# Patient Record
Sex: MALE | Race: BLACK OR AFRICAN AMERICAN | Employment: FULL TIME | URBAN - METROPOLITAN AREA
[De-identification: names, ages, dates, MRNs, and addresses within clinical notes are randomized per-mention and may not be internally consistent; named-entity substitution may affect disease eponyms.]

---

## 2017-08-29 ENCOUNTER — APPOINTMENT (OUTPATIENT)
Dept: GENERAL RADIOLOGY | Age: 32
End: 2017-08-29
Attending: EMERGENCY MEDICINE
Payer: SELF-PAY

## 2017-08-29 ENCOUNTER — HOSPITAL ENCOUNTER (EMERGENCY)
Age: 32
Discharge: HOME | End: 2017-08-29
Attending: EMERGENCY MEDICINE
Payer: SELF-PAY

## 2017-08-29 VITALS
BODY MASS INDEX: 40.63 KG/M2 | RESPIRATION RATE: 18 BRPM | OXYGEN SATURATION: 100 % | WEIGHT: 300 LBS | HEIGHT: 72 IN | TEMPERATURE: 98.5 F | DIASTOLIC BLOOD PRESSURE: 99 MMHG | HEART RATE: 60 BPM | SYSTOLIC BLOOD PRESSURE: 165 MMHG

## 2017-08-29 DIAGNOSIS — M79.641 RIGHT HAND PAIN: Primary | ICD-10-CM

## 2017-08-29 PROCEDURE — 99283 EMERGENCY DEPT VISIT LOW MDM: CPT | Performed by: PHYSICIAN ASSISTANT

## 2017-08-29 PROCEDURE — 73130 X-RAY EXAM OF HAND: CPT

## 2017-08-29 PROCEDURE — 74011250637 HC RX REV CODE- 250/637: Performed by: EMERGENCY MEDICINE

## 2017-08-29 RX ORDER — COLCHICINE 0.6 MG/1
0.6 TABLET ORAL DAILY
Qty: 10 TAB | Refills: 0 | Status: SHIPPED | OUTPATIENT
Start: 2017-08-29 | End: 2017-09-08

## 2017-08-29 RX ORDER — COLCHICINE 0.6 MG/1
0.6 CAPSULE ORAL ONCE
Status: COMPLETED | OUTPATIENT
Start: 2017-08-29 | End: 2017-08-29

## 2017-08-29 RX ADMIN — COLCHICINE 0.6 MG: 0.6 CAPSULE ORAL at 03:20

## 2017-08-29 NOTE — ED PROVIDER NOTES
HPI Comments: Patient is a 30 yo male with PMH significant for gout presents with \"gout pain\". States he has had gout for multiple years, states he has a flare up once or twice a year and states he has had pain in his hand in this location in the past from gout and this feels similar. He has full ROM of hand, no fevers or chills, no redness to area, no injury, no further complaints. Patient well appearing, NAD. Patient is a 29 y.o. male presenting with hand pain. The history is provided by the patient. No  was used. Hand Pain    Pertinent negatives include no back pain and no neck pain. Past Medical History:   Diagnosis Date    Gout     Torsion, testicular        Past Surgical History:   Procedure Laterality Date    HX UROLOGICAL           History reviewed. No pertinent family history. Social History     Social History    Marital status: SINGLE     Spouse name: N/A    Number of children: N/A    Years of education: N/A     Occupational History    Not on file. Social History Main Topics    Smoking status: Current Every Day Smoker     Packs/day: 0.25    Smokeless tobacco: Never Used      Comment: Black & Mild    Alcohol use Yes      Comment: every other day    Drug use: Yes     Special: Marijuana    Sexual activity: Not on file     Other Topics Concern    Not on file     Social History Narrative         ALLERGIES: Review of patient's allergies indicates no known allergies. Review of Systems   Constitutional: Negative for chills and fever. HENT: Negative for rhinorrhea and sore throat. Eyes: Negative for visual disturbance. Respiratory: Negative for cough and shortness of breath. Cardiovascular: Negative for chest pain and leg swelling. Gastrointestinal: Negative for abdominal pain, diarrhea, nausea and vomiting. Genitourinary: Negative for dysuria. Musculoskeletal: Positive for arthralgias. Negative for back pain and neck pain.    Skin: Negative for rash. Neurological: Negative for weakness and headaches. Psychiatric/Behavioral: The patient is not nervous/anxious. Vitals:    08/29/17 0042   BP: (!) 182/112   Pulse: (!) 56   Resp: 17   Temp: 98.6 °F (37 °C)   SpO2: 99%   Weight: 136.1 kg (300 lb)   Height: 6' (1.829 m)            Physical Exam   Constitutional: He is oriented to person, place, and time. He appears well-developed and well-nourished. HENT:   Head: Normocephalic. Right Ear: External ear normal.   Left Ear: External ear normal.   Eyes: Conjunctivae and EOM are normal. Pupils are equal, round, and reactive to light. Neck: Normal range of motion. Neck supple. No tracheal deviation present. Cardiovascular: Normal rate, regular rhythm, normal heart sounds and intact distal pulses. No murmur heard. Pulmonary/Chest: Effort normal and breath sounds normal. No respiratory distress. Abdominal: Soft. There is no tenderness. Musculoskeletal: Normal range of motion. He exhibits tenderness (to very light palpation of right hand on ulnar side as well as to deep palpation. ). Full ROM of right wrist/hand, no redness noted, no snuffbox tenderness, radial pulse 2+ bilaterally, cap refill intact in all digits. Neurological: He is alert and oriented to person, place, and time. No cranial nerve deficit. Skin: No rash noted. Nursing note and vitals reviewed.        MDM  Number of Diagnoses or Management Options  Right hand pain: new and requires workup     Amount and/or Complexity of Data Reviewed  Tests in the radiology section of CPT®: reviewed and ordered  Review and summarize past medical records: yes    Risk of Complications, Morbidity, and/or Mortality  Presenting problems: moderate  Diagnostic procedures: moderate  Management options: moderate    Patient Progress  Patient progress: stable    ED Course       Procedures    Xr Hand Rt Min 3 V    Result Date: 8/29/2017  Examination: Right hand, 3 views History: Hand pain Comparison: None available. Findings: There is no evidence of fracture or dislocation. No osseous erosions are identified. Impression: No acute osseous abnormality. 28 yo male with wrist pain:       Patient very well appearing, NAD, consistent with prior gout and exam consistent with gout.   Will place in splint for comfort and colchicine and follow up with any worsening pain, swelling, any redness, any difficulty with ROM or any fevers or chills or any further concerns

## 2017-08-29 NOTE — ED TRIAGE NOTES
Patient to ed with c/o right hand pain stating \"I think my gout is flaring up in my hand\"--patient reports difficulty with ADLs due to pain--patient right hand swollen in triage--palpable radial pulse present

## 2018-06-03 ENCOUNTER — HOSPITAL ENCOUNTER (EMERGENCY)
Age: 33
Discharge: HOME | End: 2018-06-03
Attending: EMERGENCY MEDICINE
Payer: COMMERCIAL

## 2018-06-03 ENCOUNTER — APPOINTMENT (OUTPATIENT)
Dept: GENERAL RADIOLOGY | Age: 33
End: 2018-06-03
Attending: EMERGENCY MEDICINE
Payer: COMMERCIAL

## 2018-06-03 VITALS
RESPIRATION RATE: 16 BRPM | DIASTOLIC BLOOD PRESSURE: 89 MMHG | OXYGEN SATURATION: 98 % | WEIGHT: 300 LBS | HEIGHT: 72 IN | SYSTOLIC BLOOD PRESSURE: 156 MMHG | TEMPERATURE: 98.1 F | BODY MASS INDEX: 40.63 KG/M2 | HEART RATE: 71 BPM

## 2018-06-03 DIAGNOSIS — M25.532 LEFT WRIST PAIN: Primary | ICD-10-CM

## 2018-06-03 PROCEDURE — 74011250636 HC RX REV CODE- 250/636: Performed by: EMERGENCY MEDICINE

## 2018-06-03 PROCEDURE — 99283 EMERGENCY DEPT VISIT LOW MDM: CPT | Performed by: EMERGENCY MEDICINE

## 2018-06-03 PROCEDURE — 73110 X-RAY EXAM OF WRIST: CPT

## 2018-06-03 PROCEDURE — 96372 THER/PROPH/DIAG INJ SC/IM: CPT | Performed by: EMERGENCY MEDICINE

## 2018-06-03 RX ORDER — INDOMETHACIN 25 MG/1
25 CAPSULE ORAL 3 TIMES DAILY
Qty: 30 CAP | Refills: 0 | Status: SHIPPED | OUTPATIENT
Start: 2018-06-03 | End: 2018-06-13

## 2018-06-03 RX ORDER — KETOROLAC TROMETHAMINE 30 MG/ML
15 INJECTION, SOLUTION INTRAMUSCULAR; INTRAVENOUS
Status: COMPLETED | OUTPATIENT
Start: 2018-06-03 | End: 2018-06-03

## 2018-06-03 RX ORDER — TRAMADOL HYDROCHLORIDE 50 MG/1
50 TABLET ORAL
Qty: 11 TAB | Refills: 0 | Status: SHIPPED | OUTPATIENT
Start: 2018-06-03 | End: 2018-07-31

## 2018-06-03 RX ADMIN — KETOROLAC TROMETHAMINE 15 MG: 30 INJECTION, SOLUTION INTRAMUSCULAR at 08:49

## 2018-06-03 NOTE — DISCHARGE INSTRUCTIONS
Gout: Care Instructions  Your Care Instructions    Gout is a form of arthritis caused by a buildup of uric acid crystals in a joint. It causes sudden attacks of pain, swelling, redness, and stiffness, usually in one joint, especially the big toe. Gout usually comes on without a cause. But it can be brought on by drinking alcohol (especially beer) or eating seafood and red meat. Taking certain medicines, such as diuretics or aspirin, also can bring on an attack of gout. Taking your medicines as prescribed and following up with your doctor regularly can help you avoid gout attacks in the future. Follow-up care is a key part of your treatment and safety. Be sure to make and go to all appointments, and call your doctor if you are having problems. It's also a good idea to know your test results and keep a list of the medicines you take. How can you care for yourself at home? · If the joint is swollen, put ice or a cold pack on the area for 10 to 20 minutes at a time. Put a thin cloth between the ice and your skin. · Prop up the sore limb on a pillow when you ice it or anytime you sit or lie down during the next 3 days. Try to keep it above the level of your heart. This will help reduce swelling. · Rest sore joints. Avoid activities that put weight or strain on the joints for a few days. Take short rest breaks from your regular activities during the day. · Take your medicines exactly as prescribed. Call your doctor if you think you are having a problem with your medicine. · Take pain medicines exactly as directed. ¨ If the doctor gave you a prescription medicine for pain, take it as prescribed. ¨ If you are not taking a prescription pain medicine, ask your doctor if you can take an over-the-counter medicine. · Eat less seafood and red meat. · Check with your doctor before drinking alcohol. · Losing weight, if you are overweight, may help reduce attacks of gout. But do not go on a [employer]. \" Losing a lot of weight in a short amount of time can cause a gout attack. When should you call for help? Call your doctor now or seek immediate medical care if:  ? · You have a fever. ? · The joint is so painful you cannot use it. ? · You have sudden, unexplained swelling, redness, warmth, or severe pain in one or more joints. ? Watch closely for changes in your health, and be sure to contact your doctor if:  ? · You have joint pain. ? · Your symptoms get worse or are not improving after 2 or 3 days. Where can you learn more? Go to http://tran-silas.info/. Enter N621 in the search box to learn more about \"Gout: Care Instructions. \"  Current as of: October 31, 2016  Content Version: 11.4  © 5818-1189 [employer]. Care instructions adapted under license by [employer] (which disclaims liability or warranty for this information). If you have questions about a medical condition or this instruction, always ask your healthcare professional. [address] any warranty or liability for your use of this information.

## 2018-06-03 NOTE — LETTER
400 [facility] EMERGENCY DEPT 
1454 [facility] [address] [place] [id number] 
[phone] Work/School Note Date: 6/3/2018 To Whom It May concern: 
 
Maxi Peña was seen and treated today in the emergency room by the following provider(s): 
Attending Provider: Gwen Jenkins MD.   
 
Maxi Peña may return to work on 6/6. Sincerely, 
 
 
 
 
Gwen Jenkins MD

## 2018-06-03 NOTE — ED PROVIDER NOTES
HPI Comments: 74-year-old male presents to the emergency department with worsening pain in the left wrist and hand. Started a few nights ago. Much worse overnight and this morning. Increased pain with movement. Slightly better with rest.  No trauma. No falls. No accidents    Patient has a history gout in the left wrist.    Patient is a 34 y.o. male presenting with hand pain. Hand Pain           Past Medical History:   Diagnosis Date    Gout     Torsion, testicular        Past Surgical History:   Procedure Laterality Date    HX UROLOGICAL           History reviewed. No pertinent family history. Social History     Social History    Marital status: SINGLE     Spouse name: N/A    Number of children: N/A    Years of education: N/A     Occupational History    Not on file. Social History Main Topics    Smoking status: Current Every Day Smoker     Packs/day: 0.25    Smokeless tobacco: Never Used      Comment: Black & Mild    Alcohol use Yes      Comment: every other day    Drug use: Yes     Special: Marijuana    Sexual activity: Not on file     Other Topics Concern    Not on file     Social History Narrative         ALLERGIES: Review of patient's allergies indicates no known allergies. Review of Systems   Constitutional: Negative for fever. Vitals:    06/03/18 0817   BP: (!) 176/102   Pulse: 75   Resp: 18   Temp: 98.1 °F (36.7 °C)   SpO2: 99%   Weight: 136.1 kg (300 lb)   Height: 6' (1.829 m)            Physical Exam   Constitutional: He is oriented to person, place, and time. He appears well-developed and well-nourished. HENT:   Head: Normocephalic. Musculoskeletal:        Left hand: He exhibits decreased range of motion, tenderness and swelling. Hands:  Neurological: He is alert and oriented to person, place, and time. Nursing note and vitals reviewed.        MDM  Number of Diagnoses or Management Options  Diagnosis management comments: 74-year-old male with left wrist pain.  The wrist is warm and tender somewhat swollen    Don't think a septic wrist.  We'll get an x-ray. Keep him some pain medication. Jade Ewing MD; 6/3/2018 @8:38 AM===========================================         Amount and/or Complexity of Data Reviewed  Tests in the radiology section of CPT®: ordered      X-rays negative. Start him on anti-inflammatories.   Discharge home    ED Course       Procedures

## 2018-07-31 ENCOUNTER — APPOINTMENT (OUTPATIENT)
Dept: GENERAL RADIOLOGY | Age: 33
End: 2018-07-31
Attending: EMERGENCY MEDICINE
Payer: COMMERCIAL

## 2018-07-31 ENCOUNTER — HOSPITAL ENCOUNTER (EMERGENCY)
Age: 33
Discharge: HOME | End: 2018-07-31
Attending: EMERGENCY MEDICINE
Payer: COMMERCIAL

## 2018-07-31 VITALS
HEART RATE: 75 BPM | SYSTOLIC BLOOD PRESSURE: 147 MMHG | TEMPERATURE: 98.1 F | BODY MASS INDEX: 40.63 KG/M2 | OXYGEN SATURATION: 98 % | RESPIRATION RATE: 12 BRPM | DIASTOLIC BLOOD PRESSURE: 85 MMHG | HEIGHT: 72 IN | WEIGHT: 300 LBS

## 2018-07-31 DIAGNOSIS — S82.832A OTHER CLOSED FRACTURE OF DISTAL END OF LEFT FIBULA, INITIAL ENCOUNTER: Primary | ICD-10-CM

## 2018-07-31 PROCEDURE — 99283 EMERGENCY DEPT VISIT LOW MDM: CPT | Performed by: EMERGENCY MEDICINE

## 2018-07-31 PROCEDURE — 73610 X-RAY EXAM OF ANKLE: CPT

## 2018-07-31 PROCEDURE — 75810000053 HC SPLINT APPLICATION: Performed by: EMERGENCY MEDICINE

## 2018-07-31 RX ORDER — OXYCODONE HYDROCHLORIDE 5 MG/1
5 TABLET ORAL
Qty: 19 TAB | Refills: 0 | Status: SHIPPED | OUTPATIENT
Start: 2018-07-31

## 2018-07-31 NOTE — ED PROVIDER NOTES
[facility] Seen  @ [facility] EMERGENCY DEPT in room FT10/10 Chief Complaint Patient presents with  Ankle Pain HPI:  
Jay Feldman is a 34 y.o. male who complaints of left ankle pain. Patient was play basketball rolled his ankle. Obvious deformity per EMS that is now resolved. Patient complains of lateral pain. . Worse with movement. Historian: patient Review of Systems: No fever no chills no chest pain or shortness of breath Past Medical History: 
Primary Care Doctor: None Past Medical History:  
Diagnosis Date  Gout  Torsion, testicular Past Surgical History:  
Procedure Laterality Date  HX UROLOGICAL Social History Social History  Marital status: SINGLE Spouse name: N/A  
 Number of children: N/A  
 Years of education: N/A Social History Main Topics  Smoking status: Current Every Day Smoker Packs/day: 0.25  Smokeless tobacco: Never Used Comment: Black & Mild  Alcohol use Yes Comment: every other day  Drug use: Yes Special: Marijuana  Sexual activity: Not Asked Other Topics Concern  None Social History Narrative Previous Medications No medications on file No Known Allergies Physical Exam:   
Vitals:  
 07/31/18 1419 BP: 143/70 Pulse: 73 Resp: 16 Temp: 98.1 °F (36.7 °C) SpO2: 98% Vital signs were reviewed. Pulse oximetry interpretation: normal 
 
Constitutional: well appearing, nontoxic, Head: Atraumatic, normo-cephalic,  
Ears/Nose/Throat: throat clear, mucous membranes moist, Eyes: PERRL, EOMI, anicteric, Neck: supple, FROM, no lymphadenopathy, no meningismus,  
Cardiovascular: regular rate and rhythm, no murmur, 2+ radial pulses, Respiratory: clear to auscultation with no wheezes, rales, ronchi, Abdomen: soft, non-tender, no guarding/rebound, no percussion tenderness, Musculoskeletal: Left ankle tender over the lateral malleolus. Mild swelling. No deformity Neurologic: alert, oriented, answers questions follows commands, Psychiatric: Speech pattern and content normal,   
______________________________________________________________________ 
ED Evaluation Radiology studies performed: XR ANKLE LT MIN 3 V Final Result Impression: unstable distal fibular fracture Radiographs were visualized by me No current facility-administered medications for this encounter. No current outpatient prescriptions on file. Left ankle placed in a trimalleolar splint. 
================================================================== 
ASSESSMENT: left ankle pain PLAN: left fibular fracture 
_____________________________________________________________________ Condition:  improved Disposition:  home Diagnosis:  Left fibular fracture Anne-Marie Cho MD; 7/31/2018 @4:23 PM=========================================== 
 
ED Course

## 2018-07-31 NOTE — ED NOTES
I have reviewed discharge instructions with the patient. The patient verbalized understanding. Patient left ED via Discharge Method: ambulatory to Home with self. Opportunity for questions and clarification provided. Patient given 1 scripts. To continue your aftercare when you leave the hospital, you may receive an automated call from our care team to check in on how you are doing. This is a free service and part of our promise to provide the best care and service to meet your aftercare needs.  If you have questions, or wish to unsubscribe from this service please call [phone]. Thank you for Choosing our [facility] Emergency Department.

## 2018-07-31 NOTE — ED TRIAGE NOTES
Pt in via [facility] c/o left ankle pain. States he rolled ankle playing basketball. Ems reports deformity. 10mg morphine given by ems.

## 2018-07-31 NOTE — DISCHARGE INSTRUCTIONS
Broken Ankle: Care Instructions  Your Care Instructions    An ankle may break (fracture) during sports, a fall, or other accidents. Fractures can range from a small, hairline crack, to a bone or bones broken into two or more pieces. Your treatment depends on how bad the break is. Your doctor may have put your ankle in a splint or cast to allow it to heal or to keep it stable until you see another doctor. It may take weeks or months for your ankle to heal. You can help your ankle heal with some care at home. You heal best when you take good care of yourself. Eat a variety of healthy foods, and don't smoke. You may have had a sedative to help you relax. You may be unsteady after having sedation. It can take a few hours for the medicine's effects to wear off. Common side effects of sedation include nausea, vomiting, and feeling sleepy or tired. The doctor has checked you carefully, but problems can develop later. If you notice any problems or new symptoms,  get medical treatment right away. Follow-up care is a key part of your treatment and safety. Be sure to make and go to all appointments, and call your doctor if you are having problems. It's also a good idea to know your test results and keep a list of the medicines you take. How can you care for yourself at home? · If the doctor gave you a sedative:  ¨ For 24 hours, don't do anything that requires attention to detail. It takes time for the medicine's effects to completely wear off. ¨ For your safety, do not drive or operate any machinery that could be dangerous. Wait until the medicine wears off and you can think clearly and react easily. · Put ice or a cold pack on your ankle for 10 to 20 minutes at a time. Try to do this every 1 to 2 hours for the next 3 days (when you are awake). Put a thin cloth between the ice and your cast or splint. Keep your cast or splint dry. · Follow the cast care instructions your doctor gives you.  If you have a splint, do not take it off unless your doctor tells you to. · Be safe with medicines. Take pain medicines exactly as directed. ¨ If the doctor gave you a prescription medicine for pain, take it as prescribed. ¨ If you are not taking a prescription pain medicine, ask your doctor if you can take an over-the-counter medicine. · Prop up your leg on pillows in the first few days after the injury. Keep the ankle higher than the level of your heart. This will help reduce swelling. · Do not put weight on your ankle unless your doctor tells you to. Use crutches to walk. · Follow instructions for exercises to keep your leg strong. · Wiggle your toes often to reduce swelling and stiffness. When should you call for help? Call 911 anytime you think you may need emergency care. For example, call if:    · You have chest pain, are short of breath, or you cough up blood.     · You are very sleepy and you have trouble waking up.    Call your doctor now or seek immediate medical care if:    · You have new or worse nausea or vomiting.     · You have new or worse pain.     · Your foot is cool or pale or changes color.     · You have tingling, weakness, or numbness in your toes.     · Your cast or splint feels too tight.     · You have signs of a blood clot in your leg (called a deep vein thrombosis), such as:  ¨ Pain in your calf, back of the knee, thigh, or groin. ¨ Redness or swelling in your leg.    Watch closely for changes in your health, and be sure to contact your doctor if:    · You have a problem with your splint or cast.     · You do not get better as expected. Where can you learn more? Go to http://tran-silas.info/. Enter P763 in the search box to learn more about \"Broken Ankle: Care Instructions. \"  Current as of: November 29, 2017  Content Version: 11.7  © 1694-4699 [employer].  Care instructions adapted under license by [employer] (which disclaims liability or warranty for this information). If you have questions about a medical condition or this instruction, always ask your healthcare professional. [place] [address] any warranty or liability for your use of this information.

## 2018-07-31 NOTE — LETTER
400 [facility] EMERGENCY DEPT 
[facility] [address] [place] [id number] 
[phone] Work/School Note Date: 7/31/2018 To Whom It May concern: 
 
Ebony Bueno was seen and treated today in the emergency room by the following provider(s): 
Attending Provider: Charisse Chun MD.   
 
Ebony Bueno may return to work on 8/8. Sincerely, 
 
 
 
 
Charisse Chun MD

## 2018-08-06 ENCOUNTER — ANESTHESIA EVENT (OUTPATIENT)
Dept: SURGERY | Age: 33
End: 2018-08-06
Payer: COMMERCIAL

## 2018-08-07 ENCOUNTER — ANESTHESIA (OUTPATIENT)
Dept: SURGERY | Age: 33
End: 2018-08-07
Payer: COMMERCIAL

## 2018-08-07 ENCOUNTER — APPOINTMENT (OUTPATIENT)
Dept: GENERAL RADIOLOGY | Age: 33
End: 2018-08-07
Attending: ORTHOPAEDIC SURGERY
Payer: COMMERCIAL

## 2018-08-07 ENCOUNTER — HOSPITAL ENCOUNTER (OUTPATIENT)
Age: 33
Setting detail: OUTPATIENT SURGERY
Discharge: HOME | End: 2018-08-07
Attending: ORTHOPAEDIC SURGERY | Admitting: ORTHOPAEDIC SURGERY
Payer: COMMERCIAL

## 2018-08-07 VITALS
BODY MASS INDEX: 40.69 KG/M2 | OXYGEN SATURATION: 96 % | RESPIRATION RATE: 16 BRPM | SYSTOLIC BLOOD PRESSURE: 127 MMHG | HEART RATE: 70 BPM | WEIGHT: 300 LBS | DIASTOLIC BLOOD PRESSURE: 77 MMHG | TEMPERATURE: 97.6 F

## 2018-08-07 PROCEDURE — 77030002982 HC SUT POLYSRB J&J -A: Performed by: ORTHOPAEDIC SURGERY

## 2018-08-07 PROCEDURE — 76210000021 HC REC RM PH II 0.5 TO 1 HR: Performed by: ORTHOPAEDIC SURGERY

## 2018-08-07 PROCEDURE — 77030029732 HC BIT DRL ORTHOLOC 3DI WRGH -B: Performed by: ORTHOPAEDIC SURGERY

## 2018-08-07 PROCEDURE — 77030020274 HC MISC IMPL ORTHOPEDIC: Performed by: ORTHOPAEDIC SURGERY

## 2018-08-07 PROCEDURE — C1713 ANCHOR/SCREW BN/BN,TIS/BN: HCPCS | Performed by: ORTHOPAEDIC SURGERY

## 2018-08-07 PROCEDURE — 77030002916 HC SUT ETHLN J&J -A: Performed by: ORTHOPAEDIC SURGERY

## 2018-08-07 PROCEDURE — 74011250636 HC RX REV CODE- 250/636

## 2018-08-07 PROCEDURE — 76010010054 HC POST OP PAIN BLOCK: Performed by: ORTHOPAEDIC SURGERY

## 2018-08-07 PROCEDURE — 76210000063 HC OR PH I REC FIRST 0.5 HR: Performed by: ORTHOPAEDIC SURGERY

## 2018-08-07 PROCEDURE — 76010010054 HC POST OP PAIN BLOCK

## 2018-08-07 PROCEDURE — 76010000161 HC OR TIME 1 TO 1.5 HR INTENSV-TIER 1: Performed by: ORTHOPAEDIC SURGERY

## 2018-08-07 PROCEDURE — 74011250637 HC RX REV CODE- 250/637: Performed by: ANESTHESIOLOGY

## 2018-08-07 PROCEDURE — 77030020143 HC AIRWY LARYN INTUB CGAS -A: Performed by: ANESTHESIOLOGY

## 2018-08-07 PROCEDURE — 74011250636 HC RX REV CODE- 250/636: Performed by: ANESTHESIOLOGY

## 2018-08-07 PROCEDURE — 77030025281 HC SPLNT ORTHGLS 1 BSNM -B: Performed by: ORTHOPAEDIC SURGERY

## 2018-08-07 PROCEDURE — 77030020753 HC CUF TRNQT 1BLA STRY -B: Performed by: ORTHOPAEDIC SURGERY

## 2018-08-07 PROCEDURE — 77030002933 HC SUT MCRYL J&J -A: Performed by: ORTHOPAEDIC SURGERY

## 2018-08-07 PROCEDURE — 77030019940 HC BLNKT HYPOTHRM STRY -B: Performed by: ANESTHESIOLOGY

## 2018-08-07 PROCEDURE — 77030018836 HC SOL IRR NACL ICUM -A: Performed by: ORTHOPAEDIC SURGERY

## 2018-08-07 PROCEDURE — 77030032490 HC SLV COMPR SCD KNE COVD -B: Performed by: ORTHOPAEDIC SURGERY

## 2018-08-07 PROCEDURE — 76942 ECHO GUIDE FOR BIOPSY: CPT | Performed by: ORTHOPAEDIC SURGERY

## 2018-08-07 PROCEDURE — 76060000033 HC ANESTHESIA 1 TO 1.5 HR: Performed by: ORTHOPAEDIC SURGERY

## 2018-08-07 PROCEDURE — 77030003602 HC NDL NRV BLK BBMI -B: Performed by: ANESTHESIOLOGY

## 2018-08-07 PROCEDURE — 74011250636 HC RX REV CODE- 250/636: Performed by: ORTHOPAEDIC SURGERY

## 2018-08-07 DEVICE — IMPLANTABLE DEVICE
Type: IMPLANTABLE DEVICE | Site: ANKLE | Status: FUNCTIONAL
Brand: ORTHOLOC 3DI PLATING SYSTEM

## 2018-08-07 DEVICE — IMPLANTABLE DEVICE
Type: IMPLANTABLE DEVICE | Site: ANKLE | Status: FUNCTIONAL
Brand: ORTHOLOC 3DI

## 2018-08-07 DEVICE — IMPLANTABLE DEVICE
Type: IMPLANTABLE DEVICE | Site: ANKLE | Status: FUNCTIONAL
Brand: ORTHOLOC

## 2018-08-07 RX ORDER — ONDANSETRON 2 MG/ML
INJECTION INTRAMUSCULAR; INTRAVENOUS AS NEEDED
Status: DISCONTINUED | OUTPATIENT
Start: 2018-08-07 | End: 2018-08-07 | Stop reason: HOSPADM

## 2018-08-07 RX ORDER — FENTANYL CITRATE 50 UG/ML
100 INJECTION, SOLUTION INTRAMUSCULAR; INTRAVENOUS ONCE
Status: COMPLETED | OUTPATIENT
Start: 2018-08-07 | End: 2018-08-07

## 2018-08-07 RX ORDER — PROPOFOL 10 MG/ML
INJECTION, EMULSION INTRAVENOUS AS NEEDED
Status: DISCONTINUED | OUTPATIENT
Start: 2018-08-07 | End: 2018-08-07 | Stop reason: HOSPADM

## 2018-08-07 RX ORDER — MIDAZOLAM HYDROCHLORIDE 1 MG/ML
2 INJECTION, SOLUTION INTRAMUSCULAR; INTRAVENOUS
Status: COMPLETED | OUTPATIENT
Start: 2018-08-07 | End: 2018-08-07

## 2018-08-07 RX ORDER — LIDOCAINE HYDROCHLORIDE 20 MG/ML
INJECTION, SOLUTION EPIDURAL; INFILTRATION; INTRACAUDAL; PERINEURAL AS NEEDED
Status: DISCONTINUED | OUTPATIENT
Start: 2018-08-07 | End: 2018-08-07 | Stop reason: HOSPADM

## 2018-08-07 RX ORDER — SODIUM CHLORIDE 0.9 % (FLUSH) 0.9 %
5-10 SYRINGE (ML) INJECTION AS NEEDED
Status: DISCONTINUED | OUTPATIENT
Start: 2018-08-07 | End: 2018-08-07 | Stop reason: HOSPADM

## 2018-08-07 RX ORDER — SODIUM CHLORIDE 9 MG/ML
25 INJECTION, SOLUTION INTRAVENOUS CONTINUOUS
Status: DISCONTINUED | OUTPATIENT
Start: 2018-08-07 | End: 2018-08-07 | Stop reason: HOSPADM

## 2018-08-07 RX ORDER — FAMOTIDINE 20 MG/1
20 TABLET, FILM COATED ORAL ONCE
Status: COMPLETED | OUTPATIENT
Start: 2018-08-07 | End: 2018-08-07

## 2018-08-07 RX ORDER — SODIUM CHLORIDE, SODIUM LACTATE, POTASSIUM CHLORIDE, CALCIUM CHLORIDE 600; 310; 30; 20 MG/100ML; MG/100ML; MG/100ML; MG/100ML
100 INJECTION, SOLUTION INTRAVENOUS CONTINUOUS
Status: DISCONTINUED | OUTPATIENT
Start: 2018-08-07 | End: 2018-08-07 | Stop reason: HOSPADM

## 2018-08-07 RX ORDER — ROPIVACAINE HYDROCHLORIDE 5 MG/ML
INJECTION, SOLUTION EPIDURAL; INFILTRATION; PERINEURAL AS NEEDED
Status: DISCONTINUED | OUTPATIENT
Start: 2018-08-07 | End: 2018-08-07 | Stop reason: HOSPADM

## 2018-08-07 RX ORDER — HYDROMORPHONE HYDROCHLORIDE 2 MG/ML
0.5 INJECTION, SOLUTION INTRAMUSCULAR; INTRAVENOUS; SUBCUTANEOUS
Status: DISCONTINUED | OUTPATIENT
Start: 2018-08-07 | End: 2018-08-07 | Stop reason: HOSPADM

## 2018-08-07 RX ORDER — LIDOCAINE HYDROCHLORIDE 10 MG/ML
0.1 INJECTION INFILTRATION; PERINEURAL AS NEEDED
Status: DISCONTINUED | OUTPATIENT
Start: 2018-08-07 | End: 2018-08-07 | Stop reason: HOSPADM

## 2018-08-07 RX ORDER — DEXAMETHASONE SODIUM PHOSPHATE 4 MG/ML
INJECTION, SOLUTION INTRA-ARTICULAR; INTRALESIONAL; INTRAMUSCULAR; INTRAVENOUS; SOFT TISSUE AS NEEDED
Status: DISCONTINUED | OUTPATIENT
Start: 2018-08-07 | End: 2018-08-07 | Stop reason: HOSPADM

## 2018-08-07 RX ORDER — HYDROCODONE BITARTRATE AND ACETAMINOPHEN 7.5; 325 MG/1; MG/1
1 TABLET ORAL AS NEEDED
Status: DISCONTINUED | OUTPATIENT
Start: 2018-08-07 | End: 2018-08-07 | Stop reason: HOSPADM

## 2018-08-07 RX ORDER — OXYCODONE HYDROCHLORIDE 5 MG/1
5 TABLET ORAL
Status: DISCONTINUED | OUTPATIENT
Start: 2018-08-07 | End: 2018-08-07 | Stop reason: HOSPADM

## 2018-08-07 RX ORDER — SODIUM CHLORIDE 0.9 % (FLUSH) 0.9 %
5-10 SYRINGE (ML) INJECTION EVERY 8 HOURS
Status: DISCONTINUED | OUTPATIENT
Start: 2018-08-07 | End: 2018-08-07 | Stop reason: HOSPADM

## 2018-08-07 RX ORDER — NALOXONE HYDROCHLORIDE 0.4 MG/ML
0.1 INJECTION, SOLUTION INTRAMUSCULAR; INTRAVENOUS; SUBCUTANEOUS AS NEEDED
Status: DISCONTINUED | OUTPATIENT
Start: 2018-08-07 | End: 2018-08-07 | Stop reason: HOSPADM

## 2018-08-07 RX ADMIN — ONDANSETRON 4 MG: 2 INJECTION INTRAMUSCULAR; INTRAVENOUS at 15:41

## 2018-08-07 RX ADMIN — FAMOTIDINE 20 MG: 20 TABLET ORAL at 12:30

## 2018-08-07 RX ADMIN — SODIUM CHLORIDE, SODIUM LACTATE, POTASSIUM CHLORIDE, AND CALCIUM CHLORIDE 100 ML/HR: 600; 310; 30; 20 INJECTION, SOLUTION INTRAVENOUS at 12:00

## 2018-08-07 RX ADMIN — MIDAZOLAM HYDROCHLORIDE 2 MG: 1 INJECTION, SOLUTION INTRAMUSCULAR; INTRAVENOUS at 12:58

## 2018-08-07 RX ADMIN — ROPIVACAINE HYDROCHLORIDE 30 ML: 5 INJECTION, SOLUTION EPIDURAL; INFILTRATION; PERINEURAL at 13:07

## 2018-08-07 RX ADMIN — PROPOFOL 290 MG: 10 INJECTION, EMULSION INTRAVENOUS at 14:51

## 2018-08-07 RX ADMIN — LIDOCAINE HYDROCHLORIDE 20 ML: 20 INJECTION, SOLUTION EPIDURAL; INFILTRATION; INTRACAUDAL; PERINEURAL at 13:07

## 2018-08-07 RX ADMIN — DEXAMETHASONE SODIUM PHOSPHATE 4 MG: 4 INJECTION, SOLUTION INTRA-ARTICULAR; INTRALESIONAL; INTRAMUSCULAR; INTRAVENOUS; SOFT TISSUE at 15:26

## 2018-08-07 RX ADMIN — Medication 3 G: at 14:41

## 2018-08-07 RX ADMIN — LIDOCAINE HYDROCHLORIDE 60 MG: 20 INJECTION, SOLUTION EPIDURAL; INFILTRATION; INTRACAUDAL; PERINEURAL at 14:51

## 2018-08-07 RX ADMIN — FENTANYL CITRATE 100 MCG: 50 INJECTION INTRAMUSCULAR; INTRAVENOUS at 12:58

## 2018-08-07 NOTE — ANESTHESIA PROCEDURE NOTES
Peripheral Block    Start time: 8/7/2018 1:04 PM  End time: 8/7/2018 1:07 PM  Performed by: Aleta Carroll  Authorized by: Aleta Carroll       Pre-procedure: Indications: at surgeon's request and post-op pain management    Preanesthetic Checklist: patient identified, risks and benefits discussed, site marked, timeout performed, anesthesia consent given and patient being monitored    Timeout Time: 13:04          Block Type:   Block Type:   Adductor canal  Laterality:  Left  Monitoring:  Continuous pulse ox, frequent vital sign checks, heart rate, oxygen and responsive to questions  Injection Technique:  Single shot  Procedures: ultrasound guided    Patient Position: supine  Prep: DuraPrep    Location:  Mid thigh  Needle Type:  Stimuplex  Needle Gauge:  20 G  Needle Localization:  Nerve stimulator and ultrasound guidance  Motor Response: minimal motor response >0.4 mA    Medication Injected:  0.5%  bupivacaine  Adds:  Epi 1:400K  Volume (mL):  10  Add'l Medication Injected:  1.5%  mepivacaine  Adds:  Epi 1:400K  Volume (mL):  5    Assessment:  Number of attempts:  1  Injection Assessment:  Incremental injection every 5 mL, local visualized surrounding nerve on ultrasound, negative aspiration for blood, no intravascular symptoms, no paresthesia and ultrasound image on chart  Patient tolerance:  Patient tolerated the procedure well with no immediate complications

## 2018-08-07 NOTE — BRIEF OP NOTE
BRIEF OPERATIVE NOTE    Date of Procedure: 8/7/2018   Preoperative Diagnosis: Disp fx of lateral malleolus of left fibula, init [S82.62XA]  Postoperative Diagnosis: Displaced fx of lateral malleolus of left fibula    Procedure(s):  LEFT ANKLE OPEN REDUCTION INTERNAL FIXATION    Surgeon(s) and Role:     * Quinn Ibarra MD - Primary             Surgical Staff:  Circ-1: Reggie Rogers RN  Circ-Relief: Kae Rodriguez RN  Radiology Technician: Toi Dickens, RT, R, CT  Scrub Tech-1: Pablo Virgen  Event Time In   Incision Start 1506   Incision Close 1540     Anesthesia: General   Estimated Blood Loss: min  Specimens: * No specimens in log *   Findings: no  Complications: no  Implants:   Implant Name Type Inv.  Item Serial No.  Lot No. LRB No. Used Action   [facility] Transmit [id number] Left 1 Implanted   PLATE BNE LP LAT FIB 77MM LT -- ORTHOLOC 3DI - [id number]  PLATE BNE LP LAT FIB 77MM LT -- ORTHOLOC 3DI  [employer] microcaps [id number] Left 1 Implanted   SCR BNE LCK PLT 3DIA 3.5X10MM -- ORTHOLOC HALLUX - [id number]  SCR BNE LCK PLT 3DIA 3.5X10MM -- ORTHOLOC HALLUX  [employer] [id number] Left 1 Implanted   SCR BNE LCK PLT 3DIA 3.5X12MM -- ORTHOLOC HALLUX - [id number]  SCR BNE LCK PLT 3DIA 3.5X12MM -- ORTHOLOC [employer]  [employer] [id number] Left 1 Implanted   SCR BNE LCK PLT 3DIA 3.5X14MM -- ORTHOLOC HALLUX - [id number]  SCR BNE LCK PLT 3DIA 3.5X14MM -- ORTHOLOC HALLUX  [employer]CHN[employer] [id number] Left 1 Implanted   SCR BNE LCK PLT 3DIA 3.5X16MM -- ORTHOLOC HALLUX - [id number]  SCR BNE LCK PLT 3DIA 3.5X16MM -- [address] [place] [address] [id number] Left 1 Implanted   SCR BNE OLAF NLCK LP 3.5X14MM -- Yenifer Coma - [id number]   SCR BNE OLAF NLCK LP 3.5X14MM -- Emmy [employer] TECHNOLOGY [id number] Left 2 Implanted

## 2018-08-07 NOTE — ANESTHESIA POSTPROCEDURE EVALUATION
Post-Anesthesia Evaluation and Assessment    Patient: Tori Murrieta. MRN: [id number]  SSN: [id number]    YOB: 1985  Age: 34 y.o. Sex: male       Cardiovascular Function/Vital Signs  Visit Vitals    /81    Pulse 73    Temp 36.4 °C (97.6 °F)    Resp 16    Wt 136.1 kg (300 lb)    SpO2 97%    BMI 40.69 kg/m2       Patient is status post general anesthesia for Procedure(s):  LEFT ANKLE OPEN REDUCTION INTERNAL FIXATION  . Nausea/Vomiting: None    Postoperative hydration reviewed and adequate. Pain:  Pain Scale 1: Numeric (0 - 10) (08/07/18 1600)  Pain Intensity 1: 0 (08/07/18 1600)   Managed    Neurological Status:   Neuro (WDL): Exceptions to WDL (08/07/18 1600)  Neuro  Neurologic State: Drowsy (08/07/18 1600)   At baseline    Mental Status and Level of Consciousness: Arousable    Pulmonary Status:   O2 Device: Room air (08/07/18 1613)   Adequate oxygenation and airway patent    Complications related to anesthesia: None    Post-anesthesia assessment completed.  No concerns    Signed By: Teresa Hinojosa MD     August 7, 2018

## 2018-08-07 NOTE — PERIOP NOTES
DC to car in care of wife, wife has dc instructions, and prescriptions for Norco and Keflex. Pt has crutches in car.

## 2018-08-07 NOTE — IP AVS SNAPSHOT
[address] 
[phone] Patient: [facility]. MRN: [id number] [id number] About your hospitalization You were admitted on:  August 7, 2018 You last received care in the:  [facility] PACU You were discharged on:  August 7, 2018 Why you were hospitalized Your primary diagnosis was:  Not on File Follow-up Information Follow up With Details Comments Contact Info None   None ([phone]) Patient stated that they have no PCP Blanca Burgess MD  Keep scheduled appointment, call office to confirm date and time if needed [place] Deleon [facility], [place], operated by [facility] [address] 
[phone] Discharge Orders None A check john indicates which time of day the medication should be taken. My Medications CONTINUE taking these medications Instructions Each Dose to Equal  
 Morning Noon Evening Bedtime HYDROcodone-acetaminophen  mg tablet Commonly known as:  Ann Gloria Your last dose was: Your next dose is: Take 1 Tab by mouth. 1 Tab MOTRIN  mg tablet Generic drug:  ibuprofen Your last dose was: Your next dose is: Take 600 mg by mouth. 600 mg  
    
   
   
   
  
 oxyCODONE IR 5 mg immediate release tablet Commonly known as:  Mayra [place] Your last dose was: Your next dose is: Take 1 Tab by mouth every six (6) hours as needed for Pain. Max Daily Amount: 20 mg.  
 5 mg Opioid Education Prescription Opioids: What You Need to Know: 
 
 
ACTIVITY Elevate foot. No Ice Let the office know if dressing gets saturated with water . No weight bearing. Use crutches or knee walker until seen in follow up appointment Don't put anything into the splint to relieve itching etc. Take one 325mg aspirin daily if okay with your medical doctor and you have no GI ulcer. Get up and out of bed frequently. While in bed move the legs as much as possible) DIET Day of Surgery: Clear liquids until no nausea or vomiting; then light, bland diet (Baked chicken, plain rice, grits, scrambled egg, toast). Nothing Greasy, fried or spicy today Advance to regular diet on second day, unless your doctor orders otherwise. PAIN Take pain medications as directed by your doctor. Call your doctor if pain is NOT relieved by medication. PAIN MED SIDE EFFECTS Constipation: Lots of fluids, try prune juice, then OTC stool softeners if necessary Nausea: Take medication with food. DRESSING CARE Keep dry and in place until follow up appointment CALL YOUR DOCTOR IF YOU HAVE Excessive bleeding that does not stop after holding mild pressure over the area. Temperature of 101 degrees or above. Redness, excessive swelling or bruising, and/or green or yellow, smelly discharge from incision. Loss of sensation - cold, white or blue toes. AFTER ANESTHESIA For the first 24 hours and while taking narcotics for pain: DO NOT Drive, Drink Alcoholic beverages, or make important Decisions. Be aware of dizziness following anesthesia and while taking pain medication. Learning About How to Use Crutches Your Care Instructions Crutches can help you walk when you have an injured hip, leg, knee, ankle, or foot. Your doctor will tell you how much weight-if any-you can put on your leg. Be sure your crutches fit you. When you stand up in your normal posture, there should be space for two or three fingers between the top of the crutch and your armpit.  When you let your hands hang down, the hand  should be at your wrists. When you put your hands on the hand , your elbows should be slightly bent. To stay safe when using crutches: 
· Look straight ahead, not down at your feet. · Clear away small rugs, cords, or anything else that could cause you to trip, slip, or fall. · Be very careful around pets and small children. They can get in your path when you least expect it. · Be sure the rubber tips on your crutches are clean and in good condition to help prevent slipping. · Avoid slick conditions, such as wet floors and snowy or icy driveways. In bad weather, be especially careful on curbs and steps. How to use crutches Getting ready to walk 1. Bend your elbows slightly. Press the padded top parts of the crutches against your sides, under your armpits. 2. If you have been told not to put any weight on your injured leg, keep that leg bent and off the ground. Walking with crutches 1. Put both crutches about 12 inches in front of you. 2. Put your weight on the handgrips, not on the pads under your arms. (Constant pressure against your underarms can cause numbness.) Swing your body forward. (If you have been told not to put any weight on your injured leg, keep that leg bent and off the ground.) 3. To complete the step, put your weight on the strong leg. 4. Move your crutches about 12 inches in front of you, and start the next step. 5. When you're confident using the crutches, you can move the crutches and your injured leg at the same time. Then push straight down on the crutches as you step past the crutches with your strong leg, as you would in normal walking. 6. Take small steps. 7. Use ramps and elevators when you can. Sitting down 1. To sit, back up to the chair. Use one hand to hold both crutches by the handgrips, beside your injured leg. With the other hand, hold onto the seat and slowly lower yourself onto the chair. 2. Lay the crutches on the ground near your chair. If you prop them up, they may fall over. Getting up from a chair 1. To get up from a chair,  the crutches and put them in one hand beside your injured leg. 2. Put your weight on the handgrips of the crutches and on your strong leg to stand up. Walking up stairs 1. To go up stairs, step up with your strong leg and then bring the crutches and your injured leg to the upper step. 2. For stairs that have handrails: Put both crutches under the arm opposite the handrail. Use the hand opposite the handrail to hold both crutches by the handgrips. 3. Hold onto the handrail as you go up. Put your strong leg on the step first when you go up. Walking down stairs 1. To go down stairs, put your crutches and injured leg on the lower step. 2. Bring your strong leg to the lower step. This saying may help you remember: \"Up with the good, down with the bad. \" 3. For stairs that have handrails: Put both crutches under the arm opposite the handrail. Use the hand opposite the handrail to hold both crutches by the handgrips. Hold onto the handrail as you go down. Follow the same process you use for stairs: Lead with your crutches and injured leg on the way down. Follow-up care is a key part of your treatment and safety. Be sure to make and go to all appointments, and call your doctor if you are having problems. It's also a good idea to know your test results and keep a list of the medicines you take. Where can you learn more? Go to http://tran-silas.info/. Enter D795 in the search box to learn more about \"Learning About How to Use Crutches. \" Current as of: November 29, 2017 Content Version: 11.7 © 1723-9575 [employer]. Care instructions adapted under license by [employer] (which disclaims liability or warranty for this information).  If you have questions about a medical condition or this instruction, always ask your healthcare professional. [place] [address] any warranty or liability for your use of this information. Preventing Infection at Home We care about preventing infection and avoiding the spread of germs  not only when you are in the hospital but also when you return home. When you return home from the hospital, its important to take the following steps to help prevent infection and avoid spreading germs that could infect you and others. Ask everyone in your home to follow these guidelines, too. Clean Your Hands · Clean your hands whenever your hands are visibly dirty, before you eat, before or after touching your mouth, nose or eyes, and before preparing food. Clean them after contact with body fluids, using the restroom, touching animals or changing diapers. · When washing hands, wet them with warm water and work up a lather. Rub hands for at least 15 seconds, then rinse them and pat them dry with a clean towel or paper towel. · When using hand sanitizers, it should take about 15 seconds to rub your hands dry. If not, you probably didnt apply enough . Cover Your Sneeze or Cough Germs are released into the air whenever you sneeze or cough. To prevent the spread of infection: · Turn away from other people before coughing or sneezing. · Cover your mouth or nose with a tissue when you cough or sneeze. Put the tissue in the trash. · If you dont have a tissue, cough or sneeze into your upper sleeve, not your hands. · Always clean your hands after coughing or sneezing. Care for Wounds Your skin is your bodys first line of defense against germs, but an open wound leaves an easy way for germs to enter your body. To prevent infection: · Clean your hands before and after changing wound dressings, and wear gloves to change dressings if recommended by your doctor. · Take special care with IV lines or other devices inserted into the body. If you must touch them, clean your hands first. 
· Follow any specific instructions from your doctor to care for your wounds. Contact your doctor if you experience any signs of infection, such as fever or increased redness at the surgical or wound site. Keep a [place] [address] · Clean or wipe commonly touched hard surfaces like door handles, sinks, tabletops, phones and TV remotes. · Use products labeled disinfectant to kill harmful bacteria and viruses. · Use a clean cloth or paper towel to clean and dry surfaces. Wiping surfaces with a dirty dishcloth, sponge or towel will only spread germs. · Never share toothbrushes, berg, drinking glasses, utensils, razor blades, face cloths or bath towels to avoid spreading germs. · Be sure that the linens that you sleep on are clean. · Keep pets away from wounds and wash your hands after touching pets, their toys or bedding. We care about you and your health. Remember, preventing infections is a team effort between you, your family, friends and health care providers. DISCHARGE SUMMARY from Nurse PATIENT INSTRUCTIONS: 
 
After general anesthesia or intravenous sedation, for 24 hours or while taking prescription Narcotics: · Limit your activities · Do not drive and operate hazardous machinery · Do not make important personal or business decisions · Do  not drink alcoholic beverages · If you have not urinated within 8 hours after discharge, please contact your surgeon on call. *  Please give a list of your current medications to your Primary Care Provider. *  Please update this list whenever your medications are discontinued, doses are 
    changed, or new medications (including over-the-counter products) are added. *  Please carry medication information at all times in case of emergency situations. These are general instructions for a healthy lifestyle: No smoking/ No tobacco products/ Avoid exposure to second hand smoke Surgeon General's Warning:  Quitting smoking now greatly reduces serious risk to your health. Obesity, smoking, and sedentary lifestyle greatly increases your risk for illness A healthy diet, regular physical exercise & weight monitoring are important for maintaining a healthy lifestyle You may be retaining fluid if you have a history of heart failure or if you experience any of the following symptoms:  Weight gain of 3 pounds or more overnight or 5 pounds in a week, increased swelling in our hands or feet or shortness of breath while lying flat in bed. Please call your doctor as soon as you notice any of these symptoms; do not wait until your next office visit. Recognize signs and symptoms of STROKE: 
 
F-face looks uneven A-arms unable to move or move unevenly S-speech slurred or non-existent T-time-call 911 as soon as signs and symptoms begin-DO NOT go Back to bed or wait to see if you get better-TIME IS BRAIN. Introducing [facility]! Triny Augustine introduces [email or web address] patient portal. Now you can access parts of your medical record, email your doctor's office, and request medication refills online. 1. In your internet browser, go to [email or web address] 2. Click on the First Time User? Click Here link in the Sign In box. You will see the New Member Sign Up page. 3. Enter your [email or web address] Access Code exactly as it appears below. You will not need to use this code after youve completed the sign-up process. If you do not sign up before the expiration date, you must request a new code. · [email or web address] Access Code: [email or web address] Expires: 9/1/2018  8:10 AM 
 
4. Enter the last four digits of your Social Security Number (xxxx) and Date of Birth (mm/dd/yyyy) as indicated and click Submit. You will be taken to the next sign-up page. 5. Create a [email or web address] ID.  This will be your [email or web address] login ID and cannot be changed, so think of one that is secure and easy to remember. 6. Create a [employer] password. You can change your password at any time. 7. Enter your Password Reset Question and Answer. This can be used at a later time if you forget your password. 8. Enter your e-mail address. You will receive e-mail notification when new information is available in [address] 9. Click Sign Up. You can now view and download portions of your medical record. 10. Click the Download Summary menu link to download a portable copy of your medical information. If you have questions, please visit the Frequently Asked Questions section of the [employer] website. Remember, [employer] is NOT to be used for urgent needs. For medical emergencies, dial 911. Now available from your iPhone and Android! Introducing Michael Stokes As a [employer] patient, I wanted to make you aware of our electronic visit tool called Michael Stokes. Aliveshoes/DEMANDIT allows you to connect within minutes with a medical provider 24 hours a day, seven days a week via a mobile device or tablet or logging into a secure website from your computer. You can access Michael Stokes from anywhere in the [place]. A virtual visit might be right for you when you have a simple condition and feel like you just dont want to get out of bed, or cant get away from work for an appointment, when [facility] is not available (evenings, weekends or holidays), or when youre out of town and need minor care. Electronic visits cost only $49 and if the [employer] 24/DEMANDIT provider determines a prescription is needed to treat your condition, one can be electronically transmitted to a nearby pharmacy*. Please take a moment to enroll today if you have not already done so. The enrollment process is free and takes just a few minutes.   To enroll, please download the Aliveshoes/DEMANDIT lyndsey to your tablet or phone, or visit [email or web address] to enroll on your computer. And, as an [address] [place] [address] patient with a [employer] account, the results of your visits will be scanned into your electronic medical record and your primary care provider will be able to view the scanned results. We urge you to continue to see your regular [facility] provider for your ongoing medical care. And while your primary care provider may not be the one available when you seek a Michael [employer] virtual visit, the peace of mind you get from getting a real diagnosis real time can be priceless. For more information on [employer], view our Frequently Asked Questions (FAQs) at [email or web address]. Sincerely, 
 
Thomas Lopez MD 
Chief Medical Officer [employer] *:  certain medications cannot be prescribed via Mix[employer]In Unresulted Labs-Please follow up with your PCP about these lab tests Order Current Status NC XR TECHNOLOGIST SERVICE In process Providers Seen During Your Hospitalization Provider Specialty Primary office phone Sofie Baxter MD Orthopedic Surgery [phone] Your Primary Care Physician (PCP) Primary Care Physician Office Phone Office Fax NONE ** None ** ** None ** You are allergic to the following No active allergies Recent Documentation Weight BMI Smoking Status  
  
  
  
 136.1 kg 40.69 kg/m2 Current Every Day Smoker Emergency Contacts Name Discharge Info Relation Home Work Mobile [facility] DISCHARGE CAREGIVER [3] Spouse [3] [phone] Talley Niyah  Parent [1] [phone] Patient Belongings The following personal items are in your possession at time of discharge: 
  Dental Appliances: None         Home Medications: None   Jewelry: None  Clothing: Footwear, Pants, Shirt    Other Valuables: None Please provide this summary of care documentation to your next provider. Signatures-by signing, you are acknowledging that this After Visit Summary has been reviewed with you and you have received a copy. Patient Signature:  ____________________________________________________________ Date:  ____________________________________________________________  
  
Bernarda Saab Provider Signature:  ____________________________________________________________ Date:  ____________________________________________________________

## 2018-08-07 NOTE — DISCHARGE INSTRUCTIONS
INSTRUCTIONS FOLLOWING FOOT SURGERY    ACTIVITY  Elevate foot. No Ice   Let the office know if dressing gets saturated with water . No weight bearing. Use crutches or knee walker until seen in follow up appointment  Don't put anything into the splint to relieve itching etc. Take one 325mg aspirin daily if okay with your medical doctor and you have no GI ulcer. Get up and out of bed frequently. While in bed move the legs as much as possible)    DIET  Day of Surgery: Clear liquids until no nausea or vomiting; then light, bland diet (Baked chicken, plain rice, grits, scrambled egg, toast). Nothing Greasy, fried or spicy today  Advance to regular diet on second day, unless your doctor orders otherwise. PAIN  Take pain medications as directed by your doctor. Call your doctor if pain is NOT relieved by medication. PAIN MED SIDE EFFECTS  Constipation: Lots of fluids, try prune juice, then OTC stool softeners if necessary  Nausea: Take medication with food. DRESSING CARE Keep dry and in place until follow up appointment    CALL YOUR DOCTOR IF YOU HAVE  Excessive bleeding that does not stop after holding mild pressure over the area. Temperature of 101 degrees or above. Redness, excessive swelling or bruising, and/or green or yellow, smelly discharge from incision. Loss of sensation - cold, white or blue toes. AFTER ANESTHESIA  For the first 24 hours and while taking narcotics for pain: DO NOT Drive, Drink Alcoholic beverages, or make important Decisions. Be aware of dizziness following anesthesia and while taking pain medication. Learning About How to Use Crutches  Your Care Instructions  Crutches can help you walk when you have an injured hip, leg, knee, ankle, or foot. Your doctor will tell you how much weight-if any-you can put on your leg. Be sure your crutches fit you.  When you stand up in your normal posture, there should be space for two or three fingers between the top of the crutch and your armpit. When you let your hands hang down, the hand  should be at your wrists. When you put your hands on the hand , your elbows should be slightly bent. To stay safe when using crutches:  · Look straight ahead, not down at your feet. · Clear away small rugs, cords, or anything else that could cause you to trip, slip, or fall. · Be very careful around pets and small children. They can get in your path when you least expect it. · Be sure the rubber tips on your crutches are clean and in good condition to help prevent slipping. · Avoid slick conditions, such as wet floors and snowy or icy driveways. In bad weather, be especially careful on curbs and steps. How to use crutches  Getting ready to walk    1. Bend your elbows slightly. Press the padded top parts of the crutches against your sides, under your armpits. 2. If you have been told not to put any weight on your injured leg, keep that leg bent and off the ground. Walking with crutches    1. Put both crutches about 12 inches in front of you. 2. Put your weight on the handgrips, not on the pads under your arms. (Constant pressure against your underarms can cause numbness.) Swing your body forward. (If you have been told not to put any weight on your injured leg, keep that leg bent and off the ground.)  3. To complete the step, put your weight on the strong leg. 4. Move your crutches about 12 inches in front of you, and start the next step. 5. When you're confident using the crutches, you can move the crutches and your injured leg at the same time. Then push straight down on the crutches as you step past the crutches with your strong leg, as you would in normal walking. 6. Take small steps. 7. Use ramps and elevators when you can. Sitting down    1. To sit, back up to the chair. Use one hand to hold both crutches by the handgrips, beside your injured leg.  With the other hand, hold onto the seat and slowly lower yourself onto the chair.  2. Lay the crutches on the ground near your chair. If you prop them up, they may fall over. Getting up from a chair    1. To get up from a chair,  the crutches and put them in one hand beside your injured leg. 2. Put your weight on the handgrips of the crutches and on your strong leg to stand up. Walking up stairs    1. To go up stairs, step up with your strong leg and then bring the crutches and your injured leg to the upper step. 2. For stairs that have handrails: Put both crutches under the arm opposite the handrail. Use the hand opposite the handrail to hold both crutches by the handgrips. 3. Hold onto the handrail as you go up. Put your strong leg on the step first when you go up. Walking down stairs    1. To go down stairs, put your crutches and injured leg on the lower step. 2. Bring your strong leg to the lower step. This saying may help you remember: \"Up with the good, down with the bad. \"  3. For stairs that have handrails: Put both crutches under the arm opposite the handrail. Use the hand opposite the handrail to hold both crutches by the handgrips. Hold onto the handrail as you go down. Follow the same process you use for stairs: Lead with your crutches and injured leg on the way down. Follow-up care is a key part of your treatment and safety. Be sure to make and go to all appointments, and call your doctor if you are having problems. It's also a good idea to know your test results and keep a list of the medicines you take. Where can you learn more? Go to http://tran-silas.info/. Enter A948 in the search box to learn more about \"Learning About How to Use Crutches. \"  Current as of: November 29, 2017  Content Version: 11.7  © 4774-3563 [employer]. Care instructions adapted under license by [employer] (which disclaims liability or warranty for this information).  If you have questions about a medical condition or this instruction, always ask your healthcare professional. [place] [address] any warranty or liability for your use of this information. Preventing Infection at Home  We care about preventing infection and avoiding the spread of germs - not only when you are in the hospital but also when you return home. When you return home from the hospital, its important to take the following steps to help prevent infection and avoid spreading germs that could infect you and others. Ask everyone in your home to follow these guidelines, too. Clean Your Hands  · Clean your hands whenever your hands are visibly dirty, before you eat, before or after touching your mouth, nose or eyes, and before preparing food. Clean them after contact with body fluids, using the restroom, touching animals or changing diapers. · When washing hands, wet them with warm water and work up a lather. Rub hands for at least 15 seconds, then rinse them and pat them dry with a clean towel or paper towel. · When using hand sanitizers, it should take about 15 seconds to rub your hands dry. If not, you probably didnt apply enough . Cover Your Sneeze or Cough  Germs are released into the air whenever you sneeze or cough. To prevent the spread of infection:  · Turn away from other people before coughing or sneezing. · Cover your mouth or nose with a tissue when you cough or sneeze. Put the tissue in the trash. · If you dont have a tissue, cough or sneeze into your upper sleeve, not your hands. · Always clean your hands after coughing or sneezing. Care for Wounds  Your skin is your bodys first line of defense against germs, but an open wound leaves an easy way for germs to enter your body. To prevent infection:  · Clean your hands before and after changing wound dressings, and wear gloves to change dressings if recommended by your doctor. · Take special care with IV lines or other devices inserted into the body.  If you must touch them, clean your hands first.  · Follow any specific instructions from your doctor to care for your wounds. Contact your doctor if you experience any signs of infection, such as fever or increased redness at the surgical or wound site. Keep a Clean Home  · Clean or wipe commonly touched hard surfaces like door handles, sinks, tabletops, phones and TV remotes. · Use products labeled disinfectant to kill harmful bacteria and viruses. · Use a clean cloth or paper towel to clean and dry surfaces. Wiping surfaces with a dirty dishcloth, sponge or towel will only spread germs. · Never share toothbrushes, berg, drinking glasses, utensils, razor blades, face cloths or bath towels to avoid spreading germs. · Be sure that the linens that you sleep on are clean. · Keep pets away from wounds and wash your hands after touching pets, their toys or bedding. We care about you and your health. Remember, preventing infections is a team effort between you, your family, friends and health care providers. DISCHARGE SUMMARY from Nurse    PATIENT INSTRUCTIONS:    After general anesthesia or intravenous sedation, for 24 hours or while taking prescription Narcotics:  · Limit your activities  · Do not drive and operate hazardous machinery  · Do not make important personal or business decisions  · Do  not drink alcoholic beverages  · If you have not urinated within 8 hours after discharge, please contact your surgeon on call. *  Please give a list of your current medications to your Primary Care Provider. *  Please update this list whenever your medications are discontinued, doses are      changed, or new medications (including over-the-counter products) are added. *  Please carry medication information at all times in case of emergency situations.       These are general instructions for a healthy lifestyle:    No smoking/ No tobacco products/ Avoid exposure to second hand smoke    Surgeon General's Warning: Quitting smoking now greatly reduces serious risk to your health. Obesity, smoking, and sedentary lifestyle greatly increases your risk for illness    A healthy diet, regular physical exercise & weight monitoring are important for maintaining a healthy lifestyle    You may be retaining fluid if you have a history of heart failure or if you experience any of the following symptoms:  Weight gain of 3 pounds or more overnight or 5 pounds in a week, increased swelling in our hands or feet or shortness of breath while lying flat in bed. Please call your doctor as soon as you notice any of these symptoms; do not wait until your next office visit. Recognize signs and symptoms of STROKE:    F-face looks uneven    A-arms unable to move or move unevenly    S-speech slurred or non-existent    T-time-call 911 as soon as signs and symptoms begin-DO NOT go       Back to bed or wait to see if you get better-TIME IS BRAIN.

## 2018-08-07 NOTE — ANESTHESIA PROCEDURE NOTES
Peripheral Block    Start time: 8/7/2018 12:58 PM  End time: 8/7/2018 1:03 PM  Performed by: Enoc Flannery  Authorized by: Enoc Flannery       Pre-procedure:    Indications: at surgeon's request and post-op pain management    Preanesthetic Checklist: patient identified, risks and benefits discussed, site marked, timeout performed, anesthesia consent given and patient being monitored    Timeout Time: 12:58          Block Type:   Block Type:  Popliteal  Laterality:  Left  Monitoring:  Continuous pulse ox, frequent vital sign checks, heart rate, responsive to questions and oxygen  Injection Technique:  Single shot  Procedures: ultrasound guided and nerve stimulator    Prep: DuraPrep    Location:  Lower thigh  Needle Type:  Stimuplex  Needle Gauge:  20 G  Needle Localization:  Nerve stimulator and ultrasound guidance  Motor Response: minimal motor response >0.4 mA    Medication Injected:  0.5%  bupivacaine  Adds:  Epi 1:400K  Volume (mL):  20  Add'l Medication Injected:  2.0%  lidocaine  Adds:  Epi 1:400K  Volume (mL):  15    Assessment:  Number of attempts:  1  Injection Assessment:  Incremental injection every 5 mL, local visualized surrounding nerve on ultrasound, negative aspiration for blood, no intravascular symptoms, no paresthesia and ultrasound image on chart  Patient tolerance:  Patient tolerated the procedure well with no immediate complications

## 2018-08-07 NOTE — ANESTHESIA PREPROCEDURE EVALUATION
Anesthetic History               Review of Systems / Medical History  Patient summary reviewed    Pulmonary          Smoker         Neuro/Psych              Cardiovascular                  Exercise tolerance: >4 METS     GI/Hepatic/Renal                Endo/Other        Morbid obesity     Other Findings   Comments: Drug use: Marijuana           Physical Exam    Airway  Mallampati: III  TM Distance: > 6 cm  Neck ROM: normal range of motion        Cardiovascular  Regular rate and rhythm,  S1 and S2 normal,  no murmur, click, rub, or gallop             Dental  No notable dental hx       Pulmonary  Breath sounds clear to auscultation               Abdominal         Other Findings            Anesthetic Plan    ASA: 3  Anesthesia type: general      Post-op pain plan if not by surgeon: peripheral nerve block single      Anesthetic plan and risks discussed with: Patient

## 2018-08-08 NOTE — OP NOTES
[facility] REPORT    Anastacio Bailey  MR#: [id number]  : 1985  ACCOUNT #: [de-identified]   DATE OF SERVICE: 2018    POSTOPERATIVE DIAGNOSIS:  Left displaced lateral malleolus fracture with deltoid ligament tear. POSTOPERATIVE DIAGNOSIS:  Left displaced lateral malleolus fracture with deltoid ligament tear. PROCEDURE PERFORMED:  1. Open reduction and internal fixation of left lateral malleolus fracture. 2.  Open reduction and fixation of left syndesmosis injury. 3.  Left primary repair of left deltoid ligament tear. SURGEON:  Monda Fothergill, MD.      ANESTHESIA:  General anesthesia with LMA and popliteal blocks. ESTIMATED BLOOD LOSS:  Minimal.            TOURNIQUET TIME:  35 minutes at 250 mmHg. ANTIBIOTIC PROPHYLAXIS:  Ancef given prior to procedure. INDICATIONS FOR PROCEDURE:  The patient is a 37-year-old   male who sustained a left displaced lateral malleolus fracture with deltoid ligament tear, presents today for operative fixation. Risks and benefits of the procedure include but not limited to the risks of anesthetic complications, myocardial infarction, stroke, and death as well as surgical complications including damage to nerves and blood vessels, risk for infection, incomplete pain relief, risk of arthritis and need for additional surgery were discussed with the patient. He understands the risks and wishes to proceed with surgery at this time. DETAILS OF PROCEDURE:  The patient's operative site was marked with indelible ink in the preop holding area. A block was placed by the department of anesthesia. The patient was taken to the operating room and placed supine. After preoperative surgical timeout, the left lower extremity was identified as the surgical site, prepped and draped in standard sterile fashion using a  ChloraPrep solution.   Lateral approach to distal fib was then performed at that time taking care to protect the superficial peroneal nerve and the underlying fracture site was identified and reduced in anatomic alignment and then affixed using a [facility] locking plate with appropriate length locking and nonlocking screws. The patient had an obvious syndesmosis disruption and therefore [facility] syndesmosis device also placed at this time without difficulty under fluoroscopic guidance, the medial approach to the medial malleolus was  then performed at that time. The deltoid ligament tear was identified and repaired using absorbable sutures. The skin was repaired throughout using Monocryl and nylon sutures. A sterile dressing was then applied followed by well-padded posterior splint. Anesthesia was discontinued. The patient was subsequently admitted to recovery room in satisfactory condition. He appeared to tolerate the procedure well. There were no apparent surgical or anesthetic complications. All needle and sponge counts were correct.       MD BALA Santillan / CRISTHIAN  D: 08/07/2018 16:02     T: 08/08/2018 00:34  JOB #: [id number]

## (undated) DEVICE — STERILE HOOK LOCK LATEX FREE ELASTIC BANDAGE 6INX5YD: Brand: HOOK LOCK™

## (undated) DEVICE — SUTURE MCRYL SZ 3-0 L27IN ABSRB UD L19MM PS-2 3/8 CIR PRIM Y427H

## (undated) DEVICE — ABDOMINAL PAD: Brand: DERMACEA

## (undated) DEVICE — (D)PREP SKN CHLRAPRP APPL 26ML -- CONVERT TO ITEM 371833

## (undated) DEVICE — SUTURE VCRL SZ 2-0 L27IN ABSRB UD L26MM CT-2 1/2 CIR J269H

## (undated) DEVICE — KENDALL SCD EXPRESS SLEEVES, KNEE LENGTH, MEDIUM: Brand: KENDALL SCD

## (undated) DEVICE — REM POLYHESIVE ADULT PATIENT RETURN ELECTRODE: Brand: VALLEYLAB

## (undated) DEVICE — AMD ANTIMICROBIAL GAUZE SPONGES,12 PLY USP TYPE VII, 0.2% POLYHEXAMETHYLENE BIGUANIDE HCI (PHMB): Brand: CURITY

## (undated) DEVICE — DRAPE XR C ARM 41X74IN LF --

## (undated) DEVICE — DRILL BIT

## (undated) DEVICE — SPLINT CAST W4XL30IN WHT THMB FBRGLS PRECUT INTLOK WRINKLE

## (undated) DEVICE — SKIN MARKER,REGULAR TIP WITH RULER AND LABELS: Brand: DEVON

## (undated) DEVICE — Device

## (undated) DEVICE — AMD ANTIMICROBIAL BANDAGE ROLL,6 PLY: Brand: KERLIX

## (undated) DEVICE — SOLUTION IV 1000ML 0.9% SOD CHL

## (undated) DEVICE — NON-ADHERING DRESSING: Brand: ADAPTIC®

## (undated) DEVICE — DRAPE SHT 3 QTR PROXIMA 53X77 --

## (undated) DEVICE — SUT ETHLN 3-0 18IN PS1 BLK --

## (undated) DEVICE — BUTTON SWITCH PENCIL BLADE ELECTRODE, HOLSTER: Brand: EDGE

## (undated) DEVICE — PADDING CAST W4INXL4YD ST COT COHESIVE HND TEARABLE SPEC

## (undated) DEVICE — DISPOSABLE TOURNIQUET CUFF SINGLE BLADDER, DUAL PORT AND QUICK CONNECT CONNECTOR: Brand: COLOR CUFF

## (undated) DEVICE — STERILE HOOK LOCK LATEX FREE ELASTIC BANDAGE 4INX5YD: Brand: HOOK LOCK™

## (undated) DEVICE — CURITY NON-ADHERENT STRIPS: Brand: CURITY